# Patient Record
Sex: MALE | Race: WHITE | NOT HISPANIC OR LATINO | URBAN - METROPOLITAN AREA
[De-identification: names, ages, dates, MRNs, and addresses within clinical notes are randomized per-mention and may not be internally consistent; named-entity substitution may affect disease eponyms.]

---

## 2019-07-24 ENCOUNTER — OUTPATIENT (OUTPATIENT)
Dept: OUTPATIENT SERVICES | Facility: HOSPITAL | Age: 84
LOS: 1 days | Discharge: HOME | End: 2019-07-24

## 2019-07-24 DIAGNOSIS — L89.600 PRESSURE ULCER OF UNSPECIFIED HEEL, UNSTAGEABLE: ICD-10-CM

## 2019-07-25 ENCOUNTER — FORM ENCOUNTER (OUTPATIENT)
Age: 84
End: 2019-07-25

## 2019-07-25 PROBLEM — Z00.00 ENCOUNTER FOR PREVENTIVE HEALTH EXAMINATION: Status: ACTIVE | Noted: 2019-07-25

## 2019-07-26 ENCOUNTER — APPOINTMENT (OUTPATIENT)
Dept: VASCULAR SURGERY | Facility: CLINIC | Age: 84
End: 2019-07-26
Payer: MEDICARE

## 2019-07-26 ENCOUNTER — OUTPATIENT (OUTPATIENT)
Dept: OUTPATIENT SERVICES | Facility: HOSPITAL | Age: 84
LOS: 1 days | End: 2019-07-26
Payer: COMMERCIAL

## 2019-07-26 VITALS — SYSTOLIC BLOOD PRESSURE: 149 MMHG | HEART RATE: 54 BPM | DIASTOLIC BLOOD PRESSURE: 57 MMHG

## 2019-07-26 DIAGNOSIS — S72.92XA UNSPECIFIED FRACTURE OF LEFT FEMUR, INITIAL ENCOUNTER FOR CLOSED FRACTURE: ICD-10-CM

## 2019-07-26 DIAGNOSIS — R26.2 DIFFICULTY IN WALKING, NOT ELSEWHERE CLASSIFIED: ICD-10-CM

## 2019-07-26 DIAGNOSIS — M62.81 MUSCLE WEAKNESS (GENERALIZED): ICD-10-CM

## 2019-07-26 DIAGNOSIS — R35.0 FREQUENCY OF MICTURITION: ICD-10-CM

## 2019-07-26 DIAGNOSIS — E04.2 NONTOXIC MULTINODULAR GOITER: ICD-10-CM

## 2019-07-26 DIAGNOSIS — R60.9 EDEMA, UNSPECIFIED: ICD-10-CM

## 2019-07-26 PROCEDURE — 93306 TTE W/DOPPLER COMPLETE: CPT

## 2019-07-26 PROCEDURE — 93306 TTE W/DOPPLER COMPLETE: CPT | Mod: 26

## 2019-07-26 PROCEDURE — 99204 OFFICE O/P NEW MOD 45 MIN: CPT

## 2019-07-26 RX ORDER — HEPARIN SOD,PORCINE/0.9 % NACL 10 UNIT/ML
10 KIT INTRAVENOUS
Refills: 0 | Status: ACTIVE | COMMUNITY

## 2019-07-26 RX ORDER — ATORVASTATIN CALCIUM 10 MG/1
10 TABLET, FILM COATED ORAL
Refills: 0 | Status: ACTIVE | COMMUNITY

## 2019-07-26 RX ORDER — AMLODIPINE BESYLATE 10 MG/1
10 TABLET ORAL
Refills: 0 | Status: ACTIVE | COMMUNITY

## 2019-07-26 RX ORDER — ZINC SULFATE 50(220)MG
220 (50 ZN) CAPSULE ORAL
Refills: 0 | Status: ACTIVE | COMMUNITY

## 2019-07-26 RX ORDER — HYDROMORPHONE HYDROCHLORIDE 2 MG/1
2 TABLET ORAL
Refills: 0 | Status: ACTIVE | COMMUNITY

## 2019-07-26 RX ORDER — MULTIVIT-MIN/FOLIC/VIT K/LYCOP 400-300MCG
TABLET ORAL
Refills: 0 | Status: ACTIVE | COMMUNITY

## 2019-07-26 RX ORDER — ESCITALOPRAM OXALATE 5 MG/1
5 TABLET ORAL
Refills: 0 | Status: ACTIVE | COMMUNITY

## 2019-07-26 RX ORDER — ASPIRIN 325 MG/1
TABLET, FILM COATED ORAL
Refills: 0 | Status: ACTIVE | COMMUNITY

## 2019-07-26 RX ORDER — INSULIN LISPRO 100 [IU]/ML
100 INJECTION, SOLUTION INTRAVENOUS; SUBCUTANEOUS
Refills: 0 | Status: ACTIVE | COMMUNITY

## 2019-07-26 RX ORDER — SENNOSIDES 8.6 MG TABLETS 8.6 MG/1
8.6 TABLET ORAL
Refills: 0 | Status: ACTIVE | COMMUNITY

## 2019-07-26 RX ORDER — ACETAMINOPHEN 325 MG/1
TABLET, FILM COATED ORAL
Refills: 0 | Status: ACTIVE | COMMUNITY

## 2019-07-26 RX ORDER — METOPROLOL SUCCINATE 50 MG/1
50 TABLET, EXTENDED RELEASE ORAL
Refills: 0 | Status: ACTIVE | COMMUNITY

## 2019-07-26 RX ORDER — POLYETHYLENE GLYCOL 3350 17 G/17G
17 POWDER, FOR SOLUTION ORAL
Refills: 0 | Status: ACTIVE | COMMUNITY

## 2019-07-30 NOTE — ASSESSMENT
[Arterial/Venous Disease] : arterial/venous disease [Foot care/Footwear] : foot care/footwear [FreeTextEntry1] : 85 y/o M with L heel unstagable wound 2/2 to pressure after recent hip surgery very likely from inappropriate body (re)positioning. On exam, pt with adequate perfusion; bilaterally doopler pedal signals appreciated. L heel with dry gangrene cap with no signs of infection, no drainage, no erythema. MRI report with bone marrow tissue edema, suggestive of osteomyelitis but no clinical evidence of this at this time. Pt denies any fevers or chills. L calve with signs of stasis dermatitis, no concern for cellulitis. Antibiotics may be discontinued. Specific wound care instructions given to pt to be provided to facility. Pt also instructed on keeping legs/heels elevated to decrease the edema; use wider socks and boots. He may ambulate as tolerated with rehab staff and put pressure as tolerated. Pt will return 8/7/19 for re-evaluation.\par \par Pt will see Cardiologist, Dr Lee for review of Echo, establish care and complete cardiac evaluation.

## 2019-07-30 NOTE — HISTORY OF PRESENT ILLNESS
[FreeTextEntry1] : 87 y/o M who presents from a rehab facility for vascular evaluation with left heel, un-stagable, pressure wound after recent hip replacement, and bilateral lower extremity swelling. Referred by UMAIR Mckeon. Pt underwent hip replacement about one month ago and his activity has been very limited from postop-pain and now given pressure wound on heel. He has been participating as much as he can with PT/OT and using a boot with adjustable velcro. It is fully unclear what they have been applying on the wound. He mentions his skin was intact prior to his operation. Both legs have been swelling as well and the left one with some redness. He is currently on Vanco/Zosyn at the facility for concerns of wound infection and osteomyelitis. MRI report completed at outside facility with findings consistent with subcutaneous edema and skin thickening, soft tissue with edema c/w osteomyelitis, and osteoarthritis findings. Denies any clotting/bleeding disorders, fevers/chills, rest pain, claudication or previous arterial/venous disease he is aware of. Other PMH includes CAD, HTN, HLD, T2DM, diabetic neuropathy, bilateral lower extremity swelling, insomnia, hyperparathyroidism, BPH,  CKD and CAD. \par \par He presents in wheelchair with wife, aide and Mike to visit.

## 2019-07-30 NOTE — PROCEDURE
[FreeTextEntry1] : Left heel: wound cleansed with hydrogen peroxide and betadine. A&D applied to bilateral LEs and feet. L foot covered with light kerlix.

## 2019-07-30 NOTE — PHYSICAL EXAM
[Respiratory Effort] : normal respiratory effort [2+] : left 2+ [Ankle Swelling Bilaterally] : bilaterally  [Ankle Swelling (On Exam)] : present [Ankle Swelling On The Left] : moderate [Oriented to Person] : oriented to person [] : bilaterally [Alert] : alert [Oriented to Place] : oriented to place [Oriented to Time] : oriented to time [Calm] : calm [Varicose Veins Of Lower Extremities] : not present [JVD] : no jugular venous distention  [de-identified] : round [de-identified] : calm, cooperative [FreeTextEntry1] : Bilateral LEs with mild edema, L>R and skin dryness mainly on feet. Bilateral calves with skin hyperpigmentation and L with stasis dermatitis. R proximal shin with 2 scabs, no erythema or drainage. L heel with dry eschar cap, irregular edges with peeling dry skin, no drainage or surrounding erythema. \par Bilateral toes with adequate cap refill, all pink/warm, PT monophasic and DP biphasic hand-held doopler signals.  [de-identified] : Bilaterally UEs 5/5, LEs 4/5 [de-identified] : See cardiovasc section

## 2019-08-07 ENCOUNTER — APPOINTMENT (OUTPATIENT)
Dept: HEART AND VASCULAR | Facility: CLINIC | Age: 84
End: 2019-08-07
Payer: MEDICARE

## 2019-08-07 ENCOUNTER — APPOINTMENT (OUTPATIENT)
Dept: VASCULAR SURGERY | Facility: CLINIC | Age: 84
End: 2019-08-07
Payer: MEDICARE

## 2019-08-07 VITALS
SYSTOLIC BLOOD PRESSURE: 112 MMHG | BODY MASS INDEX: 33.74 KG/M2 | TEMPERATURE: 99 F | WEIGHT: 214.99 LBS | DIASTOLIC BLOOD PRESSURE: 60 MMHG | OXYGEN SATURATION: 97 % | HEART RATE: 66 BPM | HEIGHT: 66.97 IN

## 2019-08-07 VITALS
SYSTOLIC BLOOD PRESSURE: 146 MMHG | HEIGHT: 67 IN | HEART RATE: 62 BPM | DIASTOLIC BLOOD PRESSURE: 66 MMHG | WEIGHT: 215 LBS | OXYGEN SATURATION: 95 % | BODY MASS INDEX: 33.74 KG/M2

## 2019-08-07 DIAGNOSIS — Z78.9 OTHER SPECIFIED HEALTH STATUS: ICD-10-CM

## 2019-08-07 PROCEDURE — 93005 ELECTROCARDIOGRAM TRACING: CPT | Mod: TC

## 2019-08-07 PROCEDURE — 93010 ELECTROCARDIOGRAM REPORT: CPT | Mod: 26

## 2019-08-07 PROCEDURE — 99205 OFFICE O/P NEW HI 60 MIN: CPT | Mod: 25

## 2019-08-07 PROCEDURE — 93000 ELECTROCARDIOGRAM COMPLETE: CPT

## 2019-08-07 PROCEDURE — G0447 BEHAVIOR COUNSEL OBESITY 15M: CPT | Mod: 59

## 2019-08-07 PROCEDURE — G0444 DEPRESSION SCREEN ANNUAL: CPT | Mod: 59

## 2019-08-07 PROCEDURE — 99214 OFFICE O/P EST MOD 30 MIN: CPT

## 2019-08-07 PROCEDURE — G0446: CPT | Mod: 59

## 2019-08-07 NOTE — HISTORY OF PRESENT ILLNESS
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

## 2019-08-07 NOTE — PHYSICAL EXAM
[General Appearance - Well Developed] : well developed [Normal Appearance] : normal appearance [Well Groomed] : well groomed [General Appearance - Well Nourished] : well nourished [No Deformities] : no deformities [General Appearance - In No Acute Distress] : no acute distress [Normal Conjunctiva] : the conjunctiva exhibited no abnormalities [Normal Oral Mucosa] : normal oral mucosa [Eyelids - No Xanthelasma] : the eyelids demonstrated no xanthelasmas [No Oral Cyanosis] : no oral cyanosis [No Oral Pallor] : no oral pallor [Normal Jugular Venous A Waves Present] : normal jugular venous A waves present [No Jugular Venous Suero A Waves] : no jugular venous suero A waves [Normal Jugular Venous V Waves Present] : normal jugular venous V waves present [Heart Sounds] : normal S1 and S2 [Heart Rate And Rhythm] : heart rate and rhythm were normal [Murmurs] : no murmurs present [Respiration, Rhythm And Depth] : normal respiratory rhythm and effort [Exaggerated Use Of Accessory Muscles For Inspiration] : no accessory muscle use [Auscultation Breath Sounds / Voice Sounds] : lungs were clear to auscultation bilaterally [Abdomen Soft] : soft [Abdomen Tenderness] : non-tender [Abdomen Mass (___ Cm)] : no abdominal mass palpated [Gait - Sufficient For Exercise Testing] : the gait was sufficient for exercise testing [Abnormal Walk] : normal gait [Cyanosis, Localized] : no localized cyanosis [Nail Clubbing] : no clubbing of the fingernails [Petechial Hemorrhages (___cm)] : no petechial hemorrhages [Skin Color & Pigmentation] : normal skin color and pigmentation [] : no rash [No Venous Stasis] : no venous stasis [No Skin Ulcers] : no skin ulcer [Skin Lesions] : no skin lesions [No Xanthoma] : no  xanthoma was observed [Oriented To Time, Place, And Person] : oriented to person, place, and time [Affect] : the affect was normal [Mood] : the mood was normal [No Anxiety] : not feeling anxious

## 2019-08-07 NOTE — DISCUSSION/SUMMARY
[FreeTextEntry1] : The number of diagnostic and/or management options \par CAD, HTN, HPL, CV Prevention\par \par CAD - on asa / bb Echo normal EG denies anginal sx h/o given elevated risk will need pharm stress test when more stable \par \par HTN -  continue bb at current dose and norvasc 10mg\par echo with normal central pressures\par \par PAD - per vasc Dr MANE recs\par f/u wound care\par \par Labs, radiology: ekg echo\par \par Aspirin therapy: yes\par \par LDL: low intensity statin\par \par High Complexity Medical Decision Making\par \par Concern for Heart Disease\par Annual administration of PHQ-9 for the benefit of the patient\par Score = 0; Rx = Pt reports no loss, Reassurance provided (16min)\par Follow-up arranged - next scheduled visit\par \par New patient intensive behavioral therapy for cardiovascular disease (17min)\par Assess: risk of inc CVD\par Advise: ASA utility, BP monitoring, healthy diet, result in lower risk of CAD, DM, and HTN\par Agree: pt willing to change, agrees to behavioral change to promote a healthy diet\par Assist: behavioral change re: appropriate food choices, confidence in ability lower CAD risk\par Arrange: follow up visit for progress, preventive cardiology / nutritionist referral discussed\par \par Cardiovascular Prevention counseling (16min)\par ·	Advised SBP guidelines based on ACC/AHA and SPRINT trial increased CAD PAD and mortality \par ·	Assessed willingness to attempt - contemplation stage\par ·	Agree to no added salt and added sugar diet  - prevention medicine referral, nutritionist\par ·	Assist with resources provided - prevention medicine referral, nutritionist, individual counseling\par ·	Arrange ·	Follow-up arranged - next scheduled visit\par  \par BMI of >/= 30 face-to-face behavioral counseling for obesity (16 min)\par Assess: risk of inc CVD\par Advise: Need to lose at least 10lbs in next three months, result in lower risk of CAD, DM, and HTN\par Agree: pt willing to change, agrees to 10lbs goal in 3mo\par Assist: behavioral change re: appropriate food choices, confidence in ability to lose weight\par Arrange: follow up visit for progress, preventive cardiology / nutritionist referral discussed\par

## 2019-08-15 NOTE — ADDENDUM
[FreeTextEntry1] : Documented by Negin Street acting as a scribe for Dr. Juan Perez on 08/07/2019\par

## 2019-08-15 NOTE — PHYSICAL EXAM
[Respiratory Effort] : normal respiratory effort [2+] : left 2+ [Ankle Swelling (On Exam)] : present [Ankle Swelling Bilaterally] : bilaterally  [Ankle Swelling On The Left] : moderate [] : bilaterally [Alert] : alert [Oriented to Person] : oriented to person [Oriented to Time] : oriented to time [Oriented to Place] : oriented to place [Calm] : calm [JVD] : no jugular venous distention  [de-identified] : calm, cooperative [Varicose Veins Of Lower Extremities] : not present [FreeTextEntry1] : Bilateral LEs with mild edema, L>R and skin dryness mainly on feet. Bilateral calves with skin hyperpigmentation and L with stasis dermatitis with improvement. R proximal shin with 2 scabs, no erythema or drainage. L heel with dry eschar cap, irregular edges with peeling dry skin, no drainage or surrounding erythema, stable from last visit. \par Bilateral toes with adequate cap refill, all pink/warm, PT monophasic and DP biphasic hand-held doopler signals.  [de-identified] : Bilaterally UEs 5/5, LEs 4/5 [de-identified] : obese [de-identified] : See cardiovasc section

## 2019-08-15 NOTE — HISTORY OF PRESENT ILLNESS
[FreeTextEntry1] : 87 y/o M with PMH of CAD, HTN, HLD, T2DM, diabetic neuropathy, bilateral lower extremity swelling, insomnia, hyperparathyroidism, BPH,  CKD and CAD, presents here today for follow up on left heel, un-stagable, pressure wound after recent hip replacement, and bilateral lower extremity swelling. Patient still with leg swelling bilaterally and redness on the left leg but with slight improvement. Pt continues to participate as much as he can with PT/OT and using a boot with adjustable velcro. He reports they have been compliant with wound care recommendations from last visit. He is no longer on antibiotic therapy.\par \par He presents in wheelchair with wife, aide and Mike to visit.

## 2019-08-15 NOTE — ASSESSMENT
[Foot care/Footwear] : foot care/footwear [Arterial/Venous Disease] : arterial/venous disease [FreeTextEntry1] : 85 y/o M with L heel gangrenous wound 2/2 to pressure after recent hip surgery very likely from pressure.  L heel with dry gangrene cap mildly improved since last visit, with no signs of infection, no drainage, no erythema. L calf with signs of stasis dermatitis, no concern for cellulitis. Advised patient to instruct the facility to continue with his wound care. Pt also instructed on keeping legs/heels elevated to decrease the edema; use wider socks and boots. Also, recommended for pt to be started on a mild diuretic to decrease leg edema. He may ambulate as tolerated with rehab staff and put pressure as tolerated.  Pt will in 2 weeks for follow up wound care. \par

## 2019-08-15 NOTE — END OF VISIT
[FreeTextEntry3] : All medical record entries made by the Scribe were at my, Dr. Perez's direction and personally dictated by me on 08/07/2019 I have reviewed the chart and agree that the record accurately reflects my personal performance of the history, physical exam, assessment and plan. I have also personally directed, reviewed, and agreed with the chart.\par

## 2019-08-21 ENCOUNTER — APPOINTMENT (OUTPATIENT)
Dept: VASCULAR SURGERY | Facility: CLINIC | Age: 84
End: 2019-08-21
Payer: MEDICARE

## 2019-08-21 PROCEDURE — 99214 OFFICE O/P EST MOD 30 MIN: CPT

## 2019-08-26 NOTE — END OF VISIT
[FreeTextEntry3] : All medical record entries made by the Scribe were at my, Dr. Perez's direction and personally dictated by me on 08/21/2019 I have reviewed the chart and agree that the record accurately reflects my personal performance of the history, physical exam, assessment and plan. I have also personally directed, reviewed, and agreed with the chart.\par

## 2019-08-26 NOTE — ASSESSMENT
[Foot care/Footwear] : foot care/footwear [Arterial/Venous Disease] : arterial/venous disease [FreeTextEntry1] : 85 y/o M with L heel dry gangrene un-stagable pressure wound after recent hip surgery, bilateral calf venous stasis, L calf stasis dermatitis, and chronic edema of LEs. On exam, L heel with dry gangrene cap improved since last visit, with no signs of infection, no drainage, no erythema. L calf stasis dermatitis stable, no concern for cellulitis. Advised patient to continue with his wound care (hydrogen peroxide and betadine). Pt also instructed on keeping legs/heels elevated to decrease the edema as well as a light ACE bandage may be applied from foot to below the knee, and continue use wider socks and boots. He may ambulate as tolerated with rehab staff and put pressure as tolerated.  Pt will follow up in 2-4 weeks for follow up wound care. \par

## 2019-08-26 NOTE — PROCEDURE
[FreeTextEntry1] : Left heel: wound debrided and cleansed with hydrogen peroxide and betadine. A&D applied to bilateral LEs and feet. L foot covered with light kerlix and ACE bandage.

## 2019-08-26 NOTE — ADDENDUM
[FreeTextEntry1] : Documented by Negin Street acting as a scribe for Dr. Juan Perez on 08/21/2019\par

## 2019-08-26 NOTE — HISTORY OF PRESENT ILLNESS
[FreeTextEntry1] : 87 y/o M with PMH of CAD, HTN, HLD, T2DM, diabetic neuropathy, chronic edema, insomnia, hyperparathyroidism, BPH,  CKD and CAD, presents here today for follow up on left heel, un-stagable, pressure wound after recent hip replacement, and bilateral lower extremity swelling. Patient still with leg swelling bilaterally and redness on the left leg but with improvement. Pt continues to participate as much as he can with PT/OT at the facility and using a boot with adjustable velcro. He also reports that he has walked on his feet with a walker recently. He continues to comply with wound care recommendations from last visit, off loading the heel and elevating the legs as much as possible. He has been off of antibiotic therapy. Denies any fevers or chills. \par \par He presents in wheelchair with wife and aide.

## 2019-08-26 NOTE — PHYSICAL EXAM
[Respiratory Effort] : normal respiratory effort [Ankle Swelling (On Exam)] : present [2+] : left 2+ [Ankle Swelling Bilaterally] : bilaterally  [Ankle Swelling On The Left] : moderate [Alert] : alert [] : bilaterally [Oriented to Person] : oriented to person [Oriented to Place] : oriented to place [Oriented to Time] : oriented to time [Calm] : calm [JVD] : no jugular venous distention  [Varicose Veins Of Lower Extremities] : not present [de-identified] : calm, cooperative, using wheelchair. [FreeTextEntry1] : Bilateral calves with mild edema, L>R and improvement in skin dryness. Calf hyperpigmentation stable; L side stasis dermatitis with improvement. Dry, peeling skin on plantar aspect of feet with slight improvement. R proximal shin previous wounds now healed, no scabs or erythema. L heel with dry eschar cap, irregular edges with peeling dry skin, no drainage or surrounding erythema, stable from last visit. \par Bilateral toes with adequate cap refill, all pink/warm, PT monophasic and DP biphasic hand-held doopler signals.  [de-identified] : obese [de-identified] : See cardiovasc section

## 2019-09-04 ENCOUNTER — APPOINTMENT (OUTPATIENT)
Dept: VASCULAR SURGERY | Facility: CLINIC | Age: 84
End: 2019-09-04
Payer: MEDICARE

## 2019-09-04 VITALS — SYSTOLIC BLOOD PRESSURE: 131 MMHG | HEART RATE: 63 BPM | DIASTOLIC BLOOD PRESSURE: 62 MMHG

## 2019-09-04 DIAGNOSIS — I87.2 VENOUS INSUFFICIENCY (CHRONIC) (PERIPHERAL): ICD-10-CM

## 2019-09-04 DIAGNOSIS — S80.811A ABRASION, RIGHT LOWER LEG, INITIAL ENCOUNTER: ICD-10-CM

## 2019-09-04 DIAGNOSIS — L89.629 PRESSURE ULCER OF LEFT HEEL, UNSPECIFIED STAGE: ICD-10-CM

## 2019-09-04 DIAGNOSIS — M79.89 OTHER SPECIFIED SOFT TISSUE DISORDERS: ICD-10-CM

## 2019-09-04 PROCEDURE — 99214 OFFICE O/P EST MOD 30 MIN: CPT

## 2019-09-13 NOTE — ASSESSMENT
[Arterial/Venous Disease] : arterial/venous disease [Foot care/Footwear] : foot care/footwear [FreeTextEntry1] : 87 y/o M with L heel dry gangrene un-stagable pressure wound after recent hip surgery, bilateral calf venous stasis, L calf stasis dermatitis, and chronic edema of LEs. On exam, L heel with dry gangrene cap continues to improve since last visit, with no signs of infection, no drainage, no erythema. L calf stasis dermatitis stable. Right skin abrasions clean. Advised patient to continue with his wound care (hydrogen peroxide and betadine). Pt also instructed on keeping legs/heels elevated to decrease the edema as well as a light ACE bandage to be applied from foot to below the knee, and continue use wider socks and boots. Informed patient to apply antibiotic ointment such as Bacitracin on the R anterior shin wound, cover with non adherent dressing and light kerlix. No tape to skin. He may ambulate as tolerated with rehab staff and put pressure as tolerated. Pt will follow up here in 3-4 weeks.

## 2019-09-13 NOTE — PHYSICAL EXAM
[Respiratory Effort] : normal respiratory effort [Ankle Swelling (On Exam)] : present [Ankle Swelling Bilaterally] : bilaterally  [] : bilaterally [Alert] : alert [Oriented to Person] : oriented to person [Oriented to Place] : oriented to place [Oriented to Time] : oriented to time [Calm] : calm [Ankle Swelling On The Right] : mild [JVD] : no jugular venous distention  [Varicose Veins Of Lower Extremities] : not present [de-identified] : calm, cooperative, using wheelchair. [FreeTextEntry1] : Bilateral calves with mild edema (improved) L>R and improvement in skin dryness. Calf hyperpigmentation stable; L side stasis dermatitis with improvement. Dry, peeling skin on plantar aspect of feet with slight improvement. R proximal shin with skin abrasions open, clean/pink base, no erythema. L heel with dry eschar cap, irregular edges with peeling dry skin, no drainage or surrounding erythema, stable from last visit. \par Bilateral toes with adequate cap refill, all pink/warm. [de-identified] : See cardiovasc section [de-identified] : obese

## 2019-09-13 NOTE — END OF VISIT
[FreeTextEntry3] : All medical record entries made by the Scribe were at my, Dr. Perez's direction and personally dictated by me on 09/04/2019 I have reviewed the chart and agree that the record accurately reflects my personal performance of the history, physical exam, assessment and plan. I have also personally directed, reviewed, and agreed with the chart.\par

## 2019-09-13 NOTE — PROCEDURE
[FreeTextEntry1] : Left heel: wound debrided and cleansed with hydrogen peroxide and betadine. A&D applied to bilateral LEs and feet. L foot covered with light kerlix and ACE bandage.\par Right shin: cleansed with eproxide, bacitracin applied covered with non adherent dressing and kerlix wrapped.

## 2019-09-13 NOTE — HISTORY OF PRESENT ILLNESS
[FreeTextEntry1] : 87 y/o M with PMH of CAD, HTN, HLD, T2DM, diabetic neuropathy, chronic edema, insomnia, hyperparathyroidism, BPH, CKD and CAD, presents here today for follow up on left heel, un-stagable, pressure wound after recent hip replacement, and bilateral lower extremity swelling. Patient still with leg swelling bilaterally and redness on the left leg but with improvement. He reports he is now on a daily mild diuretic (recommended during last visit). Pt continues to participate as much as he can with PT/OT at the facility and using a boot with adjustable velcro. He also reports that he has walked on his feet with a walker recently. He continues to comply with wound care recommendations from last visit, off loading the heel and elevating the legs as much as possible. Denies any fevers or chills. \par \par He presents in wheelchair with wife and aide.

## 2019-09-13 NOTE — ADDENDUM
[FreeTextEntry1] : Documented by Negin Street acting as a scribe for Dr. Juan Perez on 09/04/2019\par

## 2019-09-25 ENCOUNTER — APPOINTMENT (OUTPATIENT)
Dept: VASCULAR SURGERY | Facility: CLINIC | Age: 84
End: 2019-09-25

## 2020-10-22 ENCOUNTER — LABORATORY RESULT (OUTPATIENT)
Age: 85
End: 2020-10-22

## 2020-10-22 ENCOUNTER — APPOINTMENT (OUTPATIENT)
Dept: NEPHROLOGY | Facility: CLINIC | Age: 85
End: 2020-10-22
Payer: MEDICARE

## 2020-10-22 VITALS — SYSTOLIC BLOOD PRESSURE: 157 MMHG | HEART RATE: 71 BPM | DIASTOLIC BLOOD PRESSURE: 62 MMHG

## 2020-10-22 VITALS — OXYGEN SATURATION: 98 % | WEIGHT: 185 LBS | TEMPERATURE: 98.5 F | BODY MASS INDEX: 29 KG/M2 | HEART RATE: 64 BPM

## 2020-10-22 DIAGNOSIS — N18.9 CHRONIC KIDNEY DISEASE, UNSPECIFIED: ICD-10-CM

## 2020-10-22 DIAGNOSIS — R29.6 REPEATED FALLS: ICD-10-CM

## 2020-10-22 DIAGNOSIS — I73.9 PERIPHERAL VASCULAR DISEASE, UNSPECIFIED: ICD-10-CM

## 2020-10-22 PROCEDURE — 36415 COLL VENOUS BLD VENIPUNCTURE: CPT

## 2020-10-22 PROCEDURE — 93000 ELECTROCARDIOGRAM COMPLETE: CPT

## 2020-10-22 PROCEDURE — 99205 OFFICE O/P NEW HI 60 MIN: CPT | Mod: 25

## 2020-10-22 NOTE — ASSESSMENT
[FreeTextEntry1] : Plan\par \par 1) Given findings on percussion on exam , high suspicion of urinary retention of obstructed bladder for which I recommended direct evaluation in the ER, which patients daughter declined. Therefore I have sent labs and instructed patient undergo sonogram of kidney and labs ASAP. \par 2) EKG showed: sinus rhythm with RBBB, old septal infarct, and T wave abnormality, not substantially different from last years tracing. \par 3) Regarding patients Anemia patient has required multiple transfusion with decrease in HGB, have sent lab evaluation. \par 4) Regarding chronic diarrhea, patient will require more in depth evaluation, patient will require more comprehensive GI evaluation, but the site of his work up will depend results to above.

## 2020-10-22 NOTE — HISTORY OF PRESENT ILLNESS
[FreeTextEntry1] : The patient is a 87 year old Male presenting for initial evaluation of HTN, HLD, T2DM, CKD, CAD\par \par Chief Complaint:\par Patient presents to the office for initial evaluation HTN, CKD, T2DM. Patient presents with his daughter who reports a one year hx of chronic diarrhea, elevated blood pressure and severe anemia. \par \par \par Labs taken on 10/16/2020 reveal: ALT 10, AST 11, Billirubin 0.3, Calcium: 9.0, Cl:103, NA+: 139, Cr: 0.6 \par \par \par \par Past Medical History:\par - Patient had COVID-19 in April 2020 [place] and was discharged with oxygen via nasal canula. \par \par \par Current Medications: \par Finasteride 5 mg, Tamsulosin 0.4 mg, Atorvastatin 10 mg, Escitalopram 10 mg, Amlodipine 5 mg, ASA 81 mg, Metoprolol succinate 25 mg, Gailax , Santyl 25- ugm, Eliquis 2.5 mg, Olanzapine 2.5 mg, Escitalopram 5 mg, Cholestyramine, Albuterol solution, Metronidazol, Latanoprost. \par \par Allergies: None

## 2020-10-22 NOTE — ADDENDUM
[FreeTextEntry1] : This note was written by Yvette Pardo on 10/22/2020 acting as scribe for Italo Sher M.D.\par \par I, Italo Sher  have read and attest that all the information, medical decision making and discharge instructions within are true and accurate.

## 2020-10-22 NOTE — END OF VISIT
[>50% of the face to face encounter time was spent on counseling and/or coordination of care for ___] : Greater than 50% of the face to face encounter time was spent on counseling and/or coordination of care for [unfilled] [FreeTextEntry4] : This note was written by Yvette Pardo on 10/22/2020 acting as scribe for Italo Sher M.D.\par \par I, Italo Sher  have read and attest that all the information, medical decision making and discharge instructions within are true and accurate.

## 2020-10-22 NOTE — PHYSICAL EXAM
[General Appearance - Alert] : alert [PERRL With Normal Accommodation] : pupils were equal in size, round, and reactive to light [Outer Ear] : the ears and nose were normal in appearance [Neck Appearance] : the appearance of the neck was normal [Heart Rate And Rhythm] : heart rate was normal and rhythm regular [No CVA Tenderness] : no ~M costovertebral angle tenderness [FreeTextEntry1] : R

## 2020-10-23 LAB
24R-OH-CALCIDIOL SERPL-MCNC: 39.6 PG/ML
25(OH)D3 SERPL-MCNC: 35.9 NG/ML
ALDOSTERONE SERUM: 9.4 NG/DL
APTT BLD: 35 SEC
BASOPHILS # BLD AUTO: 0.03 K/UL
BASOPHILS NFR BLD AUTO: 0.4 %
CYSTATIN C SERPL-MCNC: 1.27 MG/L
DEPRECATED D DIMER PPP IA-ACNC: 393 NG/ML DDU
EOSINOPHIL # BLD AUTO: 0.24 K/UL
EOSINOPHIL NFR BLD AUTO: 3.3 %
ERYTHROCYTE [SEDIMENTATION RATE] IN BLOOD BY WESTERGREN METHOD: 33 MM/HR
ESTIMATED AVERAGE GLUCOSE: 169 MG/DL
FERRITIN SERPL-MCNC: 97 NG/ML
FOLATE SERPL-MCNC: >20 NG/ML
GFR/BSA.PRED SERPLBLD CYS-BASED-ARV: 51 ML/MIN
HBA1C MFR BLD HPLC: 7.5 %
HCT VFR BLD CALC: 34.6 %
HCYS SERPL-MCNC: 9.1 UMOL/L
HGB BLD-MCNC: 10.3 G/DL
IMM GRANULOCYTES NFR BLD AUTO: 0.3 %
INR PPP: 1.12 RATIO
LYMPHOCYTES # BLD AUTO: 1.94 K/UL
LYMPHOCYTES NFR BLD AUTO: 26.7 %
MAN DIFF?: NORMAL
MCHC RBC-ENTMCNC: 27.9 PG
MCHC RBC-ENTMCNC: 29.8 GM/DL
MCV RBC AUTO: 93.8 FL
MONOCYTES # BLD AUTO: 0.81 K/UL
MONOCYTES NFR BLD AUTO: 11.2 %
NEUTROPHILS # BLD AUTO: 4.22 K/UL
NEUTROPHILS NFR BLD AUTO: 58.1 %
NT-PROBNP SERPL-MCNC: 815 PG/ML
PLATELET # BLD AUTO: 248 K/UL
PSA FREE FLD-MCNC: 14 %
PSA FREE SERPL-MCNC: 0.1 NG/ML
PSA SERPL-MCNC: 0.75 NG/ML
PT BLD: 13.1 SEC
RBC # BLD: 3.69 M/UL
RBC # FLD: 16 %
RENIN PLASMA: 3.4 PG/ML
RHEUMATOID FACT SER QL: 10 IU/ML
SARS-COV-2 IGG SERPL IA-ACNC: 126 INDEX
SARS-COV-2 IGG SERPL QL IA: POSITIVE
T3FREE SERPL-MCNC: 1.78 PG/ML
T3RU NFR SERPL: 1 TBI
T4 FREE SERPL-MCNC: 1 NG/DL
T4 SERPL-MCNC: 5.5 UG/DL
TRANSFERRIN SERPL-MCNC: 186 MG/DL
TSH SERPL-ACNC: 1.89 UIU/ML
VIT B12 SERPL-MCNC: 551 PG/ML
WBC # FLD AUTO: 7.26 K/UL

## 2020-10-25 LAB
ALBUMIN SERPL ELPH-MCNC: 3.4 G/DL
ALP BLD-CCNC: 84 U/L
ALT SERPL-CCNC: 11 U/L
AMYLASE/CREAT SERPL: 51 U/L
ANA PAT FLD IF-IMP: ABNORMAL
ANA SER IF-ACNC: ABNORMAL
ANION GAP SERPL CALC-SCNC: 17 MMOL/L
APPEARANCE: ABNORMAL
AST SERPL-CCNC: 16 U/L
BACTERIA: ABNORMAL
BILIRUB SERPL-MCNC: <0.2 MG/DL
BILIRUBIN URINE: NEGATIVE
BLOOD URINE: NEGATIVE
BUN SERPL-MCNC: 28 MG/DL
C3 SERPL-MCNC: 118 MG/DL
C4 SERPL-MCNC: 24 MG/DL
CALCIUM SERPL-MCNC: 9 MG/DL
CALCIUM SERPL-MCNC: 9 MG/DL
CELIACPAN: NORMAL
CHLORIDE SERPL-SCNC: 102 MMOL/L
CHOLEST SERPL-MCNC: 111 MG/DL
CO2 SERPL-SCNC: 21 MMOL/L
COLOR: YELLOW
CREAT SERPL-MCNC: 0.57 MG/DL
CRP SERPL-MCNC: 0.57 MG/DL
DEPRECATED KAPPA LC FREE/LAMBDA SER: 1.45 RATIO
GGT SERPL-CCNC: 15 U/L
GLUCOSE QUALITATIVE U: ABNORMAL
GLUCOSE SERPL-MCNC: 327 MG/DL
HBV SURFACE AB SER QL: NONREACTIVE
HBV SURFACE AG SER QL: NONREACTIVE
HCV AB SER QL: NONREACTIVE
HCV S/CO RATIO: 0.15 S/CO
HDLC SERPL-MCNC: 44 MG/DL
HYALINE CASTS: 1 /LPF
IRON SATN MFR SERPL: 12 %
IRON SERPL-MCNC: 27 UG/DL
KAPPA LC CSF-MCNC: 4.3 MG/DL
KAPPA LC SERPL-MCNC: 6.24 MG/DL
KETONES URINE: NEGATIVE
LDLC SERPL CALC-MCNC: 42 MG/DL
LEUKOCYTE ESTERASE URINE: ABNORMAL
M TB IFN-G BLD-IMP: NEGATIVE
MAGNESIUM SERPL-MCNC: 2.1 MG/DL
MICROSCOPIC-UA: NORMAL
MPO AB + PR3 PNL SER: NORMAL
NITRITE URINE: POSITIVE
NONHDLC SERPL-MCNC: 67 MG/DL
PARATHYROID HORMONE INTACT: 11 PG/ML
PH URINE: 8.5
PHOSPHATE SERPL-MCNC: 2.7 MG/DL
POTASSIUM SERPL-SCNC: 4.2 MMOL/L
PROT SERPL-MCNC: 6.7 G/DL
PROTEIN URINE: ABNORMAL
QUANTIFERON TB PLUS MITOGEN MINUS NIL: 4.06 IU/ML
QUANTIFERON TB PLUS NIL: 0.02 IU/ML
QUANTIFERON TB PLUS TB1 MINUS NIL: 0.18 IU/ML
QUANTIFERON TB PLUS TB2 MINUS NIL: 0.31 IU/ML
RED BLOOD CELLS URINE: 2 /HPF
SODIUM SERPL-SCNC: 140 MMOL/L
SPECIFIC GRAVITY URINE: 1.02
SQUAMOUS EPITHELIAL CELLS: 1 /HPF
THYROGLOB AB SERPL-ACNC: <20 IU/ML
THYROPEROXIDASE AB SERPL IA-ACNC: 18.1 IU/ML
TIBC SERPL-MCNC: 233 UG/DL
TRIGL SERPL-MCNC: 126 MG/DL
UIBC SERPL-MCNC: 206 UG/DL
URATE SERPL-MCNC: 2.8 MG/DL
UROBILINOGEN URINE: NORMAL
WHITE BLOOD CELLS URINE: 0 /HPF

## 2020-11-01 LAB
ALBUMIN MFR SERPL ELPH: 43.8 %
ALBUMIN SERPL-MCNC: 2.9 G/DL
ALBUMIN/GLOB SERPL: 0.8 RATIO
ALP BONE SERPL-MCNC: 14 MCG/L
ALPHA1 GLOB MFR SERPL ELPH: 5.1 %
ALPHA1 GLOB SERPL ELPH-MCNC: 0.3 G/DL
ALPHA2 GLOB MFR SERPL ELPH: 12.6 %
ALPHA2 GLOB SERPL ELPH-MCNC: 0.8 G/DL
B-GLOBULIN MFR SERPL ELPH: 13.3 %
B-GLOBULIN SERPL ELPH-MCNC: 0.9 G/DL
C1Q IMMUNE COMPLEX: <1.2 UG EQ/ML
C3D IMMUNE COMPLEXES: 15.8 UG EQ/ML
CAROTENE SERPL-MCNC: 28 MCG/DL
CELIAC DISEASE INTERPRETATION: NORMAL
CELIAC GENE PAIRS PRESENT: YES
COLLAGEN CTX SERPL-MCNC: 668 PG/ML
DEPRECATED KAPPA LC FREE/LAMBDA SER: 1.45 RATIO
DQ ALPHA 1: NORMAL
DQ BETA 1: NORMAL
EPO SERPL-MCNC: 10 MIU/ML
GAMMA GLOB FLD ELPH-MCNC: 1.7 G/DL
GAMMA GLOB MFR SERPL ELPH: 25.2 %
IGA SER QL IEP: 533 MG/DL
IGG SER QL IEP: 1702 MG/DL
IGG4 SER-MCNC: 94.9 MG/DL
IGM SER QL IEP: 42 MG/DL
IMMUNOGLOBULIN A (IGA): 492 MG/DL
INTERPRETATION SERPL IEP-IMP: NORMAL
KAPPA LC CSF-MCNC: 4.3 MG/DL
KAPPA LC SERPL-MCNC: 6.24 MG/DL
KAPPA TOTAL LIGHT CHAIN, URINE: 4.06 MG/DL
KAPPA/LAMBDA TOTAL LIGHT CHAIN RATIO, URINE: 2.16
LAMBDA TOTAL LIGHT CHAIN, URINE: 1.88 MG/DL
M PROTEIN SPEC IFE-MCNC: NORMAL
METHYLMALONATE SERPL-SCNC: 196 NMOL/L
PROT SERPL-MCNC: 6.7 G/DL
PROT SERPL-MCNC: 6.7 G/DL

## 2020-11-05 ENCOUNTER — APPOINTMENT (OUTPATIENT)
Dept: NEPHROLOGY | Facility: CLINIC | Age: 85
End: 2020-11-05
Payer: MEDICARE

## 2020-11-05 DIAGNOSIS — I10 ESSENTIAL (PRIMARY) HYPERTENSION: ICD-10-CM

## 2020-11-05 DIAGNOSIS — E11.40 TYPE 2 DIABETES MELLITUS WITH DIABETIC NEUROPATHY, UNSPECIFIED: ICD-10-CM

## 2020-11-05 DIAGNOSIS — E78.5 HYPERLIPIDEMIA, UNSPECIFIED: ICD-10-CM

## 2020-11-05 DIAGNOSIS — E21.3 HYPERPARATHYROIDISM, UNSPECIFIED: ICD-10-CM

## 2020-11-05 DIAGNOSIS — G47.00 INSOMNIA, UNSPECIFIED: ICD-10-CM

## 2020-11-05 DIAGNOSIS — E11.9 TYPE 2 DIABETES MELLITUS W/OUT COMPLICATIONS: ICD-10-CM

## 2020-11-05 DIAGNOSIS — I25.10 ATHEROSCLEROTIC HEART DISEASE OF NATIVE CORONARY ARTERY W/OUT ANGINA PECTORIS: ICD-10-CM

## 2020-11-05 DIAGNOSIS — Z87.438 PERSONAL HISTORY OF OTHER DISEASES OF MALE GENITAL ORGANS: ICD-10-CM

## 2020-11-05 PROCEDURE — 99443: CPT | Mod: 95

## 2020-11-05 NOTE — ADDENDUM
[FreeTextEntry1] :  Documented by Petar Mcmahan acting as a scribe for Dr. Italo Goldberg on 11/05/2020.

## 2020-11-05 NOTE — HISTORY OF PRESENT ILLNESS
[Home] : at home, [unfilled] , at the time of the visit. [Medical Office: ([facility])___] : at the medical office located in  [Family Member] : family member [Verbal consent obtained from patient] : the patient, [unfilled] [FreeTextEntry4] : patient's daughter [FreeTextEntry1] : The patient is a 87 year old Male presenting for f/u evaluation of HTN, HLD, DM, CKD, CAD.\par \par Patient had complained of inability to empty his bladder. He had urology evaluation with Dr. Molina at [facility] and was reportedly initially advised to have urinary catheter placed, but then advised not to and to proceed with his current status. Patient has a large post void urine volume. He also c/o continued diarrhea and patient has not recently been evaluated by GI. He has had past GI evaluation for diarrhea approximately one year ago.\par \par Abdominal US from 10/26/20 shows a significantly limited study, no free fluid. \par \par Labs taken on 10/23/20 reveal: COVID-19 IgG positive, transferrin 186, proBNP 815, PTH 11, CO2 of 21, glucose 327, BUN 28, crp 0.57, uric acid 2.8, iron sat 12%, RBC 3.69, HGB 10.3, HCT 34.6, sed rate 33, D-dimer 393, A1C 7.5, urine pH 8.5, urine protein 300, urine glucose 500, positive urine bacteria.\par \par Current Medications: Finasteride 5 mg, Tamsulosin 0.4 mg, Atorvastatin 10 mg, Escitalopram 10 mg, Amlodipine 5 mg, ASA 81 mg, Metoprolol succinate 25 mg, Gailax , Santyl 25- ugm, Eliquis 2.5 mg, Olanzapine 2.5 mg, Escitalopram 5 mg, Cholestyramine, Albuterol solution, Metronidazol, Latanoprost.

## 2020-11-05 NOTE — ASSESSMENT
[FreeTextEntry1] : Plan: \par 1) HTN: Patient is unsure of current bp status. Continue on current regimen and will reassess pressure and regimen in office at next visit. Patient will go for repeat blood work before next visit.\par 2) Renal function: Patient's creatinine and eGFR are in acceptable levels without indication of renal failure.\par 3) COVID-19 status: Patient is positive for COVID-19 antibodies. Advised patient to continue covid precautions throughout the duration of the covid pandemic. \par 4) Diarrhea: have requested that the patient have past GI workup results sent to our office for review. Advise that patient have GI evaluation.\par 5) Labs: Patient will obtain repeat labs before our next visit.\par \par Medication changes: no change today.\par \par Plan to reconvene with patient in the beginning of December in the office.

## 2020-11-05 NOTE — END OF VISIT
[Time Spent: ___ minutes] : I have spent [unfilled] minutes of time on the encounter. [>50% of the face to face encounter time was spent on counseling and/or coordination of care for ___] : Greater than 50% of the face to face encounter time was spent on counseling and/or coordination of care for [unfilled] [FreeTextEntry3] : All medical record entries made by the Scribe were at my, Dr. Italo Goldberg, direction and personally dictated by me on 11/05/2020. I have reviewed the chart and agree that the record accurately reflects my personal performance of the history, physical exam, assessment and plan. I have also personally directed, reviewed, and agreed with the chart.

## 2020-11-11 LAB
ANNOTATION COMMENT IMP: NORMAL
HLA-DQ2: POSITIVE
HLA-DQ8 QL: POSITIVE
REF LAB TEST METHOD: NORMAL

## 2021-08-23 ENCOUNTER — APPOINTMENT (OUTPATIENT)
Dept: PULMONOLOGY | Facility: CLINIC | Age: 86
End: 2021-08-23

## 2021-08-25 ENCOUNTER — APPOINTMENT (OUTPATIENT)
Dept: PULMONOLOGY | Facility: CLINIC | Age: 86
End: 2021-08-25

## 2021-10-13 ENCOUNTER — APPOINTMENT (OUTPATIENT)
Dept: PULMONOLOGY | Facility: CLINIC | Age: 86
End: 2021-10-13
Payer: MEDICARE

## 2021-10-13 VITALS
SYSTOLIC BLOOD PRESSURE: 126 MMHG | HEART RATE: 48 BPM | OXYGEN SATURATION: 94 % | DIASTOLIC BLOOD PRESSURE: 57 MMHG | TEMPERATURE: 97.3 F | HEIGHT: 66.97 IN

## 2021-10-13 DIAGNOSIS — R91.8 OTHER NONSPECIFIC ABNORMAL FINDING OF LUNG FIELD: ICD-10-CM

## 2021-10-13 DIAGNOSIS — U07.1 COVID-19: ICD-10-CM

## 2021-10-13 PROCEDURE — 99204 OFFICE O/P NEW MOD 45 MIN: CPT | Mod: CS

## 2021-10-13 NOTE — END OF VISIT
[Time Spent: ___ minutes] : I have spent [unfilled] minutes of time on the encounter. [FreeTextEntry3] : Pt seen with APRIL Rhodes and agree on the above plan of care.

## 2021-10-13 NOTE — ASSESSMENT
[FreeTextEntry1] : Right lung mass\par \par Referred by Yanick.  Reviewed the CT scan form 9/2021 there is a right upper lobe mass with high suspicion of lung CA.  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  Discussed he is not a candidate for surgery or chemo.  Had a long discussion regarding his functional status and dementia with the daughter.  Pt is non verbal during the visit.  Pt daughter unaware if the is up to date with other CA screening.  Options are to follow with CT guided biopsy and possible stereotactic radiation or do nothing.  I had a discussion with Dr. Nguyen and isiah.  Pt family will make a discuss with the providers to determine if they want to move forward with biopsy.   Will will reach out to me if they want to move forward with biopsy\par \par Dr.Kyi SHOAIB NGUYEN [phone] (private number [phone])

## 2021-10-13 NOTE — HISTORY OF PRESENT ILLNESS
[TextBox_4] : 88 yr old male dementia, Non smoker, no history of CA, COVID infection 2 yr ago was hospitalized a [place].  He worked with drilling class and crystals.  He was born in [place].  \par \par She was seen by local PCP had a CXR was told he had PNA and went for CT scan.  He coughs when he eats / drinks.  He is wheel chair bound.  He is not loosing weight.  No wheezing, edema.  He is oxygen at home since COVID.  He falls asleep often during the day.\par \par Dr. Rojas PCP